# Patient Record
Sex: FEMALE | URBAN - METROPOLITAN AREA
[De-identification: names, ages, dates, MRNs, and addresses within clinical notes are randomized per-mention and may not be internally consistent; named-entity substitution may affect disease eponyms.]

---

## 2023-04-12 ENCOUNTER — TELEPHONE ENCOUNTER (OUTPATIENT)
Dept: URBAN - METROPOLITAN AREA CLINIC 113 | Facility: CLINIC | Age: 28
End: 2023-04-12

## 2023-06-30 ENCOUNTER — OFFICE VISIT (OUTPATIENT)
Dept: URBAN - METROPOLITAN AREA CLINIC 113 | Facility: CLINIC | Age: 28
End: 2023-06-30
Payer: COMMERCIAL

## 2023-06-30 ENCOUNTER — DASHBOARD ENCOUNTERS (OUTPATIENT)
Age: 28
End: 2023-06-30

## 2023-06-30 ENCOUNTER — WEB ENCOUNTER (OUTPATIENT)
Dept: URBAN - METROPOLITAN AREA CLINIC 113 | Facility: CLINIC | Age: 28
End: 2023-06-30

## 2023-06-30 VITALS
RESPIRATION RATE: 18 BRPM | WEIGHT: 176 LBS | HEIGHT: 68 IN | DIASTOLIC BLOOD PRESSURE: 85 MMHG | SYSTOLIC BLOOD PRESSURE: 118 MMHG | BODY MASS INDEX: 26.67 KG/M2 | TEMPERATURE: 97.3 F | HEART RATE: 95 BPM

## 2023-06-30 DIAGNOSIS — R11.15 CYCLICAL VOMITING SYNDROME: ICD-10-CM

## 2023-06-30 PROCEDURE — 99204 OFFICE O/P NEW MOD 45 MIN: CPT | Performed by: STUDENT IN AN ORGANIZED HEALTH CARE EDUCATION/TRAINING PROGRAM

## 2023-06-30 RX ORDER — SERDEXMETHYLPHENIDATE AND DEXMETHYLPHENIDATE 5.2; 26.1 MG/1; MG/1
1 CAPSULE IN THE MORNING CAPSULE ORAL ONCE A DAY
Status: ACTIVE | COMMUNITY

## 2023-06-30 RX ORDER — FLUOXETINE 40 MG/1
1 CAPSULE CAPSULE ORAL ONCE A DAY
Status: ACTIVE | COMMUNITY

## 2023-06-30 NOTE — HPI-ADOLESCENT DEPRESSION SCREENING
Initial visit 6/30/23; PCP Dr. Rojas Murguia Ms. Horne is a 28 year old female who is referred to our GI clinic after a visit to the [facility]. She was seen 12/23/22 for nausea/vomiting. Labs during that visit were notable for WBC 19, Hb 14, plt 233, Na 136, K 3.7, BUN 12, Cr 0.7. Liver enzymes normal. Lipase 55. UA c/w UTI. Abd/Pelvis CT showed small bilateral ovarian follicular cysts with mild gastric stasis pattern, no other acute abnormalities. She was diagnosed with a UTI and discharged with a course of cephalosporin.  Today, she states that over the past 6-12 months she has had intermittent episodes of abdominal pain, nausea/vomiting and diarrhea that last for up to 1.5 weeks and then resolves sponntaneously. She has noticed that symptoms are always accompanied by her menstrual cycle. In between episodes she feels well. She has lost approximately 20 pounds in the last 6 months unintentionally. She states that over the last 2 months she has not had these symptoms recur. Her symptoms began after removal of her IUD. She has a prescription of zofran at home but has not needed to use it. She does admit to smoking a "delta 8" cartridge daily with increasing frequency. She denies family history of GI issues. She has not discussed these symptoms with her GYN physician.

## 2023-09-27 ENCOUNTER — OFFICE VISIT (OUTPATIENT)
Dept: URBAN - METROPOLITAN AREA CLINIC 113 | Facility: CLINIC | Age: 28
End: 2023-09-27

## 2024-04-28 NOTE — PHYSICAL EXAM SKIN:
no rashes , no jaundice present , good turgor , no masses , no tenderness on palpation
to get better